# Patient Record
Sex: FEMALE | Race: WHITE | Employment: FULL TIME | URBAN - METROPOLITAN AREA
[De-identification: names, ages, dates, MRNs, and addresses within clinical notes are randomized per-mention and may not be internally consistent; named-entity substitution may affect disease eponyms.]

---

## 2019-03-22 ENCOUNTER — DOCUMENTATION ONLY (OUTPATIENT)
Dept: SURGERY | Age: 49
End: 2019-03-22

## 2019-03-22 NOTE — PROGRESS NOTES
Outside breast imaging CDs have been received from [facility] and will be placed in nurse station drawer at [facility]. Pt has appt with Dr. Rosetta Kinsey on 4/5/19. Tonia Ramesh, has scanned reports into CC.

## 2019-04-05 ENCOUNTER — DOCUMENTATION ONLY (OUTPATIENT)
Dept: SURGERY | Age: 49
End: 2019-04-05

## 2019-04-05 ENCOUNTER — OFFICE VISIT (OUTPATIENT)
Dept: SURGERY | Age: 49
End: 2019-04-05

## 2019-04-05 VITALS
HEART RATE: 103 BPM | SYSTOLIC BLOOD PRESSURE: 116 MMHG | DIASTOLIC BLOOD PRESSURE: 65 MMHG | WEIGHT: 117 LBS | BODY MASS INDEX: 18.8 KG/M2 | HEIGHT: 66 IN

## 2019-04-05 DIAGNOSIS — N64.89 RADIAL SCAR OF BREAST: Primary | ICD-10-CM

## 2019-04-05 DIAGNOSIS — D36.9 PAPILLOMA: ICD-10-CM

## 2019-04-05 NOTE — PATIENT INSTRUCTIONS
Learning About Breast Cancer Screening  What is breast cancer screening? Breast cancer occurs when cells that are not normal grow in one or both of your breasts. Screening tests can help find breast cancer early. Cancer is easier to treat when it's found early. Having concerns about breast cancer is common. That's why it's important to talk with your doctor about when to start and how often to get screened for breast cancer. How is breast cancer screening done? Several screening tests can be used to check for breast cancer. · Mammograms check for signs of cancer using X-rays. They can show tumors that are too small for you or your doctor to feel. During a mammogram, a machine squeezes your breasts to make them flatter and easier to X-ray. At least two pictures are taken of each breast. One is taken from the top and one from the side. · 3-D mammograms are also called digital breast tomosynthesis. Your breast is positioned on a flat plate. A top plate is pressed against your breast to keep it in position. The X-ray arm then moves in an arc above the breast and takes many pictures. A computer uses these X-rays to create a three-dimensional image. · Clinical breast exams are a doctor's exam. Your doctor carefully feels your breasts and under your arms to check for lumps or other changes. After the screening, your doctor will tell you the results. You will also be told if you need any follow-up tests. When should you get screened? Talk with your doctor about when you should start being tested for breast cancer. How often you get tested and the kind of tests you get will depend on your age and your risk. The guidelines that follow are for women who have an average risk for breast cancer. If you have a higher risk for breast cancer, such as having a family history of breast cancer in multiple relatives or at a young age, your doctor may recommend different screening for you.   · Ages 21 to 44: Some experts recommend that women have a clinical breast exam every 3 years, starting at age 21. Ask your doctor how often you should have this test. If you have a high risk for breast cancer, talk with your doctor about when to start yearly mammograms and other screening tests. · Ages 36 and older: Talk with your doctor about how often you should have mammograms and clinical breast exams. What is your risk for breast cancer? If you don't already know your risk of breast cancer, you can ask your doctor about it. You can also look it up at www.cancer.gov/bcrisktool/. If your doctor says that you have a high or very high risk, ask about ways to reduce your risk. These could include getting extra screening, taking medicine, or having surgery. If you have a strong family history of breast cancer, ask your doctor about genetic testing. What steps can you take to stay healthy? Some things that increase your risk of breast cancer, such as your age and being female, cannot be controlled. But you can do some things to stay as healthy as you can. · Learn what your breasts normally look and feel like. If you notice any changes, tell your doctor. · Drink alcohol wisely. Your risk goes up the more you drink. For the best health, women should have no more than 1 drink a day or 7 drinks a week. · If you smoke, quit. When you quit smoking, you lower your chances of getting many types of cancer. You can also do your best to eat well, be active, and stay at a healthy weight. Eating healthy foods and being active every day, as well as staying at a healthy weight, may help prevent cancer. Where can you learn more? Go to http://dave-gumaro.info/. Enter A026 in the search box to learn more about \"Learning About Breast Cancer Screening. \"  Current as of: March 27, 2018  Content Version: 11.9  © 8194-4899 [employer].  Care instructions adapted under license by [employer] (which disclaims liability or warranty for this information). If you have questions about a medical condition or this instruction, always ask your healthcare professional. [place] [address] any warranty or liability for your use of this information.

## 2019-04-05 NOTE — PROGRESS NOTES
HISTORY OF PRESENT ILLNESS  Sumit Silver is a 51 y.o. female. HPI NEW Patient presents for consultation at the request of Dr. Jihan Vinson for LEFT breast papilloma and radial scar. The patient had no abnormal breast symptoms to report. This was detected from imaging. Having some soreness and bruising to biopsy site. History LEFT breast papilloma in 2015. Dr. Justin Craig. 3/8/19 - LEFT breast biopsy pathology revealed papilloma, radial scar, fibrocystic changes. No family history of breast or ovarian cancer. Breast imaging-  Screening mammogram 12/2018 BI-RADS 0 architectural distortion left breast  LEFT breast diagnostic mammogram 2/22/19 BI-RADS 4C. All reports in scanned into chart. [address] Past Medical History:   Diagnosis Date    Environmental allergies     Fibromyalgia     Other ill-defined conditions(799.89)     mitral valve prolapse    Other ill-defined conditions(799.89)     fibromyalgia     Past Surgical History:   Procedure Laterality Date    HX BREAST BIOPSY Left     benign excisional biopsy    HX HEENT      eye surgery     Social History     Socioeconomic History    Marital status: UNKNOWN     Spouse name: Not on file    Number of children: Not on file    Years of education: Not on file    Highest education level: Not on file   Occupational History    Not on file   Social Needs    Financial resource strain: Not on file    Food insecurity:     Worry: Not on file     Inability: Not on file    Transportation needs:     Medical: Not on file     Non-medical: Not on file   Tobacco Use    Smoking status: Never Smoker    Smokeless tobacco: Never Used   Substance and Sexual Activity    Alcohol use:  Yes     Alcohol/week: 7.0 oz     Types: 14 Glasses of wine per week    Drug use: No    Sexual activity: Yes     Partners: Male   Lifestyle    Physical activity:     Days per week: Not on file     Minutes per session: Not on file    Stress: Not on file   Relationships    Social connections:     Talks on phone: Not on file     Gets together: Not on file     Attends Confucianism service: Not on file     Active member of club or organization: Not on file     Attends meetings of clubs or organizations: Not on file     Relationship status: Not on file    Intimate partner violence:     Fear of current or ex partner: Not on file     Emotionally abused: Not on file     Physically abused: Not on file     Forced sexual activity: Not on file   Other Topics Concern    Not on file   Social History Narrative    Not on file     OB History    None      Obstetric Comments   Menarche:  14. LMP: 3/19/19. # of Children:  3. Age at Delivery of First Child:  28.   Hysterectomy/oophorectomy:  NO/NO. Breast Bx:  Yes LEFT 2015. Hx of Breast Feeding:  yes. BCP:  yes. Hormone therapy:  no.                No current outpatient medications on file. Allergies   Allergen Reactions    Morphine Nausea and Vomiting    Percocet [Oxycodone-Acetaminophen] Nausea and Vomiting       Review of Systems   Constitutional: Negative. HENT: Negative. Eyes: Negative. Respiratory: Negative. Cardiovascular: Negative. Gastrointestinal: Positive for heartburn. Genitourinary: Positive for frequency and urgency. Musculoskeletal: Positive for myalgias. Skin: Negative. Neurological: Negative. Endo/Heme/Allergies: Negative. Psychiatric/Behavioral: Negative. All other systems reviewed and are negative. Physical Exam   Constitutional: She is oriented to person, place, and time. She appears well-developed and well-nourished. HENT:   Head: Normocephalic. Eyes: EOM are normal.   Neck: Neck supple. No thyromegaly present. Cardiovascular: Intact distal pulses. Pulmonary/Chest: Effort normal and breath sounds normal. Right breast exhibits no inverted nipple, no mass, no nipple discharge, no skin change and no tenderness.  Left breast exhibits no inverted nipple, no mass, no nipple discharge, no skin change and no tenderness. Breasts are symmetrical.   Abdominal: Soft. Musculoskeletal: Normal range of motion. Lymphadenopathy:     She has no cervical adenopathy. She has no axillary adenopathy. Neurological: She is alert and oriented to person, place, and time. Skin: Skin is warm and dry. Nursing note and vitals reviewed. BREAST ULTRASOUND, Preop planning  Indication:preop planning  left Breast 8:00 3 cfn   Technique: The area was scanned using a high-frequency linear-array near-field transducer  Findings: 8 mm irregular mass with dropout  Impression: Biopsy site visible with ultrasound  Disposition:  Will schedule us guided excisional biopsy    ASSESSMENT and PLAN    ICD-10-CM ICD-9-CM    1. Radial scar of breast N64.89 611.89    2. Papilloma D36.9 [id number]      - due to risk of upgrade for both lesions recommend left breast us guided excisional biopsy  The procedure and risks were discussed. Risks include bleeding, bruising, scar, infection, need for more surgery  She understood and wished to proceed.

## 2019-04-05 NOTE — LETTER
4/5/2019 10:57 AM 
 
Patient:  Shea Pisano YOB: 1970 Date of Visit: 4/5/2019 Dear Clement Evangelista MD 
[address] [place] [address] [address] [place] [id number] VIA Facsimile: [phone] 
 : Thank you for referring Ms. Markus Castillo to me for evaluation/treatment. Below are the relevant portions of my assessment and plan of care. HISTORY OF PRESENT ILLNESS Shea Pisano is a 51 y.o. female. HPI NEW Patient presents for consultation at the request of Dr. Aretha Tam for LEFT breast papilloma and radial scar. The patient had no abnormal breast symptoms to report. This was detected from imaging. Having some soreness and bruising to biopsy site. History LEFT breast papilloma in 2015. Dr. Manolo Poe. 3/8/19 - LEFT breast biopsy pathology revealed papilloma, radial scar, fibrocystic changes. No family history of breast or ovarian cancer. Breast imaging- 
Screening mammogram 12/2018 BI-RADS 0 architectural distortion left breast 
LEFT breast diagnostic mammogram 2/22/19 BI-RADS 4C. All reports in scanned into chart. [address] Past Medical History:  
Diagnosis Date  Environmental allergies  Fibromyalgia  Other ill-defined conditions(799.89)   
 mitral valve prolapse  Other ill-defined conditions(799.89)   
 fibromyalgia Past Surgical History:  
Procedure Laterality Date  HX BREAST BIOPSY Left   
 benign excisional biopsy  HX HEENT    
 eye surgery Social History Socioeconomic History  Marital status: UNKNOWN Spouse name: Not on file  Number of children: Not on file  Years of education: Not on file  Highest education level: Not on file Occupational History  Not on file Social Needs  Financial resource strain: Not on file  Food insecurity:  
  Worry: Not on file Inability: Not on file  Transportation needs:  
  Medical: Not on file Non-medical: Not on file Tobacco Use  Smoking status: Never Smoker  Smokeless tobacco: Never Used Substance and Sexual Activity  Alcohol use: Yes Alcohol/week: 7.0 oz Types: 14 Glasses of wine per week  Drug use: No  
 Sexual activity: Yes  
  Partners: Male Lifestyle  Physical activity:  
  Days per week: Not on file Minutes per session: Not on file  Stress: Not on file Relationships  Social connections:  
  Talks on phone: Not on file Gets together: Not on file Attends Quaker service: Not on file Active member of club or organization: Not on file Attends meetings of clubs or organizations: Not on file Relationship status: Not on file  Intimate partner violence:  
  Fear of current or ex partner: Not on file Emotionally abused: Not on file Physically abused: Not on file Forced sexual activity: Not on file Other Topics Concern  Not on file Social History Narrative  Not on file OB History None Obstetric Comments Menarche:  14. LMP: 3/19/19. # of Children:  3. Age at Delivery of First Child:  28.   Hysterectomy/oophorectomy:  NO/NO. Breast Bx:  Yes LEFT 2015. Hx of Breast Feeding:  yes. BCP:  yes. Hormone therapy:  no.  
 
  
  
  
 
No current outpatient medications on file. Allergies Allergen Reactions  Morphine Nausea and Vomiting  Percocet [Oxycodone-Acetaminophen] Nausea and Vomiting Review of Systems Constitutional: Negative. HENT: Negative. Eyes: Negative. Respiratory: Negative. Cardiovascular: Negative. Gastrointestinal: Positive for heartburn. Genitourinary: Positive for frequency and urgency. Musculoskeletal: Positive for myalgias. Skin: Negative. Neurological: Negative. Endo/Heme/Allergies: Negative. Psychiatric/Behavioral: Negative. All other systems reviewed and are negative. Physical Exam  
Constitutional: She is oriented to person, place, and time. She appears well-developed and well-nourished.   
HENT:  
 Head: Normocephalic. Eyes: EOM are normal.  
Neck: Neck supple. No thyromegaly present. Cardiovascular: Intact distal pulses. Pulmonary/Chest: Effort normal and breath sounds normal. Right breast exhibits no inverted nipple, no mass, no nipple discharge, no skin change and no tenderness. Left breast exhibits no inverted nipple, no mass, no nipple discharge, no skin change and no tenderness. Breasts are symmetrical.  
Abdominal: Soft. Musculoskeletal: Normal range of motion. Lymphadenopathy:  
  She has no cervical adenopathy. She has no axillary adenopathy. Neurological: She is alert and oriented to person, place, and time. Skin: Skin is warm and dry. Nursing note and vitals reviewed. BREAST ULTRASOUND, Preop planning Indication:preop planning  left Breast 8:00 3 cfn Technique: The area was scanned using a high-frequency linear-array near-field transducer Findings: 8 mm irregular mass with dropout Impression: Biopsy site visible with ultrasound Disposition:  Will schedule us guided excisional biopsy ASSESSMENT and PLAN 
  ICD-10-CM ICD-9-CM 1. Radial scar of breast N64.89 611.89   
2. Papilloma D36.9 [id number]   
 
- due to risk of upgrade for both lesions recommend left breast us guided excisional biopsy The procedure and risks were discussed. Risks include bleeding, bruising, scar, infection, need for more surgery She understood and wished to proceed. If you have questions, please do not hesitate to call me. I look forward to following Ms. Fontaine along with you. Sincerely, Yobani Beck MD

## 2019-04-05 NOTE — COMMUNICATION BODY
HISTORY OF PRESENT ILLNESS  Maritza Arevalo is a 51 y.o. female. HPI NEW Patient presents for consultation at the request of Dr. Jose A Winn for LEFT breast papilloma and radial scar. The patient had no abnormal breast symptoms to report. This was detected from imaging. Having some soreness and bruising to biopsy site. History LEFT breast papilloma in 2015. Dr. Azucena Jay. 3/8/19 - LEFT breast biopsy pathology revealed papilloma, radial scar, fibrocystic changes. No family history of breast or ovarian cancer. Breast imaging-  Screening mammogram 12/2018 BI-RADS 0 architectural distortion left breast  LEFT breast diagnostic mammogram 2/22/19 BI-RADS 4C. All reports in scanned into chart. [address] Past Medical History:   Diagnosis Date    Environmental allergies     Fibromyalgia     Other ill-defined conditions(799.89)     mitral valve prolapse    Other ill-defined conditions(799.89)     fibromyalgia     Past Surgical History:   Procedure Laterality Date    HX BREAST BIOPSY Left     benign excisional biopsy    HX HEENT      eye surgery     Social History     Socioeconomic History    Marital status: UNKNOWN     Spouse name: Not on file    Number of children: Not on file    Years of education: Not on file    Highest education level: Not on file   Occupational History    Not on file   Social Needs    Financial resource strain: Not on file    Food insecurity:     Worry: Not on file     Inability: Not on file    Transportation needs:     Medical: Not on file     Non-medical: Not on file   Tobacco Use    Smoking status: Never Smoker    Smokeless tobacco: Never Used   Substance and Sexual Activity    Alcohol use:  Yes     Alcohol/week: 7.0 oz     Types: 14 Glasses of wine per week    Drug use: No    Sexual activity: Yes     Partners: Male   Lifestyle    Physical activity:     Days per week: Not on file     Minutes per session: Not on file    Stress: Not on file   Relationships    Social connections:     Talks on phone: Not on file     Gets together: Not on file     Attends Sikhism service: Not on file     Active member of club or organization: Not on file     Attends meetings of clubs or organizations: Not on file     Relationship status: Not on file    Intimate partner violence:     Fear of current or ex partner: Not on file     Emotionally abused: Not on file     Physically abused: Not on file     Forced sexual activity: Not on file   Other Topics Concern    Not on file   Social History Narrative    Not on file     OB History    None      Obstetric Comments   Menarche:  14. LMP: 3/19/19. # of Children:  3. Age at Delivery of First Child:  28.   Hysterectomy/oophorectomy:  NO/NO. Breast Bx:  Yes LEFT 2015. Hx of Breast Feeding:  yes. BCP:  yes. Hormone therapy:  no.                No current outpatient medications on file. Allergies   Allergen Reactions    Morphine Nausea and Vomiting    Percocet [Oxycodone-Acetaminophen] Nausea and Vomiting       Review of Systems   Constitutional: Negative. HENT: Negative. Eyes: Negative. Respiratory: Negative. Cardiovascular: Negative. Gastrointestinal: Positive for heartburn. Genitourinary: Positive for frequency and urgency. Musculoskeletal: Positive for myalgias. Skin: Negative. Neurological: Negative. Endo/Heme/Allergies: Negative. Psychiatric/Behavioral: Negative. All other systems reviewed and are negative. Physical Exam   Constitutional: She is oriented to person, place, and time. She appears well-developed and well-nourished. HENT:   Head: Normocephalic. Eyes: EOM are normal.   Neck: Neck supple. No thyromegaly present. Cardiovascular: Intact distal pulses. Pulmonary/Chest: Effort normal and breath sounds normal. Right breast exhibits no inverted nipple, no mass, no nipple discharge, no skin change and no tenderness.  Left breast exhibits no inverted nipple, no mass, no nipple discharge, no skin change and no tenderness. Breasts are symmetrical.   Abdominal: Soft. Musculoskeletal: Normal range of motion. Lymphadenopathy:     She has no cervical adenopathy. She has no axillary adenopathy. Neurological: She is alert and oriented to person, place, and time. Skin: Skin is warm and dry. Nursing note and vitals reviewed. BREAST ULTRASOUND, Preop planning  Indication:preop planning  left Breast 8:00 3 cfn   Technique: The area was scanned using a high-frequency linear-array near-field transducer  Findings: 8 mm irregular mass with dropout  Impression: Biopsy site visible with ultrasound  Disposition:  Will schedule us guided excisional biopsy    ASSESSMENT and PLAN    ICD-10-CM ICD-9-CM    1. Radial scar of breast N64.89 611.89    2. Papilloma D36.9 [id number]      - due to risk of upgrade for both lesions recommend left breast us guided excisional biopsy  The procedure and risks were discussed. Risks include bleeding, bruising, scar, infection, need for more surgery  She understood and wished to proceed.

## 2019-05-29 NOTE — PERIOP NOTES
PAT call to patient. Reports that she has a hx of mitral valve prolapse, has seen cardiology in the past >10 years (unable to recall name of cardiologist). States that she needs to take abx prior to dental appointment. Call to pcp office  Notes requested. 1530 call to MACO Betancourt NP, reported last echo 10/2006 \"mild posterior mitral leaflet prolapse with mild/moderate regurg\". confirmed that ancef 2 gm IV on call to OR is appropriate antibiotic coverage for pt hx of MVP. Will notify Dr. Hazel Son office re abx.    1167 spoke with Meño Levy in Dr. Hazel Son office. She will notify Dr. Eduar Hernández re above.

## 2019-05-29 NOTE — PERIOP NOTES
[facility]  Ambulatory Surgery Unit  Pre-operative Instructions    Surgery/Procedure Date  6/6            Tentative Arrival Time 6:15am      1. On the day of your surgery/procedure, please report to the Ambulatory Surgery Unit Registration Desk and sign in at your designated time. The Ambulatory Surgery Unit is located in [facility] on the [facility] side of the [facility] across from the [address] [place] [address]. Please have all of your health insurance cards and a photo ID. 2. You must have someone with you to drive you home, as you should not drive a car for 24 hours following anesthesia. Please make arrangements for a responsible adult friend or family member to stay with you for at least the first 24 hours after your surgery. 3. Do not have anything to eat or drink (including water, gum, mints, coffee, juice) after 11:59 PM  6/5. This may not apply to medications prescribed by your physician. (Please note below the special instructions with medications to take the morning of surgery, if applicable.)    4. We recommend you do not drink any alcoholic beverages for 24 hours before and after your surgery. 5. Contact your surgeons office for instructions on the following medications: non-steroidal anti-inflammatory drugs (i.e. Advil, Aleve), vitamins, and supplements. (Some surgeons will want you to stop these medications prior to surgery and others may allow you to take them)   **If you are currently taking Plavix, Coumadin, Aspirin and/or other blood-thinning agents, contact your surgeon for instructions. ** Your surgeon will partner with the physician prescribing these medications to determine if it is safe to stop or if you need to continue taking. Please do not stop taking these medications without instructions from your surgeon.     6. In an effort to help prevent surgical site infection, we ask that you shower with an anti-bacterial soap (i.e. Dial/Safeguard, or the soap provided to you at your preadmission testing appointment) for 3 days prior to and on the morning of surgery, using a fresh towel after each shower. (Please begin this process with fresh bed linens.) Do not apply any lotions, powders, or deodorants after the shower on the day of your procedure. If applicable, please do not shave the operative site for 48 hours prior to surgery. 7. Wear comfortable clothes. Wear glasses instead of contacts. Do not bring any jewelry or money (other than copays or fees as instructed). Do not wear make-up, particularly mascara, the morning of your surgery. Do not wear nail polish, particularly if you are having foot /hand surgery. Wear your hair loose or down, no ponytails, buns, asmita pins or clips. All body piercings must be removed. 8. You should understand that if you do not follow these instructions your surgery may be cancelled. If your physical condition changes (i.e. fever, cold or flu) please contact your surgeon as soon as possible. 9. It is important that you be on time. If a situation occurs where you may be late, or if you have any questions or problems, please call [phone].    10. Your surgery time may be subject to change. You will receive a phone call the day prior to surgery to confirm your arrival time. Special Instructions: Take all medications and inhalers, as prescribed, on the morning of surgery with a sip of water EXCEPT: none    I understand a pre-operative phone call will be made to verify my surgery time. In the event that I am not available, I give permission for a message to be left on my answering service and/or with another person?       yes      Reviewed by phone with pt, verbalized understanding    ___________________      ___________________      ________________  (Signature of Patient)          (Witness)                   (Date and Time)

## 2019-05-30 ENCOUNTER — DOCUMENTATION ONLY (OUTPATIENT)
Dept: SURGERY | Age: 49
End: 2019-05-30

## 2019-05-30 DIAGNOSIS — D36.9 DUCTAL PAPILLOMA: Primary | ICD-10-CM

## 2019-05-30 DIAGNOSIS — N64.89 RADIAL SCAR OF BREAST: ICD-10-CM

## 2019-06-05 ENCOUNTER — ANESTHESIA EVENT (OUTPATIENT)
Dept: SURGERY | Age: 49
End: 2019-06-05
Payer: COMMERCIAL

## 2019-06-05 PROCEDURE — 77030038269 HC DRN EXT URIN PURWCK BARD -A

## 2019-06-06 ENCOUNTER — HOSPITAL ENCOUNTER (OUTPATIENT)
Age: 49
Setting detail: OUTPATIENT SURGERY
Discharge: HOME | End: 2019-06-06
Attending: SURGERY | Admitting: SURGERY
Payer: COMMERCIAL

## 2019-06-06 ENCOUNTER — ANESTHESIA (OUTPATIENT)
Dept: SURGERY | Age: 49
End: 2019-06-06
Payer: COMMERCIAL

## 2019-06-06 VITALS
HEIGHT: 66 IN | OXYGEN SATURATION: 98 % | TEMPERATURE: 98.2 F | SYSTOLIC BLOOD PRESSURE: 108 MMHG | HEART RATE: 66 BPM | RESPIRATION RATE: 14 BRPM | DIASTOLIC BLOOD PRESSURE: 62 MMHG | BODY MASS INDEX: 22.02 KG/M2 | WEIGHT: 137 LBS

## 2019-06-06 DIAGNOSIS — D36.9 DUCTAL PAPILLOMA: ICD-10-CM

## 2019-06-06 DIAGNOSIS — N64.89 RADIAL SCAR OF BREAST: ICD-10-CM

## 2019-06-06 LAB — HCG UR QL: NEGATIVE

## 2019-06-06 PROCEDURE — 74011250636 HC RX REV CODE- 250/636

## 2019-06-06 PROCEDURE — 77030021352 HC CBL LD SYS DISP COVD -B: Performed by: SURGERY

## 2019-06-06 PROCEDURE — 74011250636 HC RX REV CODE- 250/636: Performed by: ANESTHESIOLOGY

## 2019-06-06 PROCEDURE — 81025 URINE PREGNANCY TEST: CPT

## 2019-06-06 PROCEDURE — 76030000000 HC AMB SURG OR TIME 0.5 TO 1: Performed by: SURGERY

## 2019-06-06 PROCEDURE — 77030002996 HC SUT SLK J&J -A: Performed by: SURGERY

## 2019-06-06 PROCEDURE — 88305 TISSUE EXAM BY PATHOLOGIST: CPT

## 2019-06-06 PROCEDURE — 76210000040 HC AMBSU PH I REC FIRST 0.5 HR: Performed by: SURGERY

## 2019-06-06 PROCEDURE — 77030020143 HC AIRWY LARYN INTUB CGAS -A: Performed by: NURSE ANESTHETIST, CERTIFIED REGISTERED

## 2019-06-06 PROCEDURE — 74011000250 HC RX REV CODE- 250: Performed by: SURGERY

## 2019-06-06 PROCEDURE — 77030011640 HC PAD GRND REM COVD -A: Performed by: SURGERY

## 2019-06-06 PROCEDURE — 77030011267 HC ELECTRD BLD COVD -A: Performed by: SURGERY

## 2019-06-06 PROCEDURE — 76060000061 HC AMB SURG ANES 0.5 TO 1 HR: Performed by: SURGERY

## 2019-06-06 PROCEDURE — 74011250637 HC RX REV CODE- 250/637

## 2019-06-06 PROCEDURE — 77030002933 HC SUT MCRYL J&J -A: Performed by: SURGERY

## 2019-06-06 PROCEDURE — 77030018836 HC SOL IRR NACL ICUM -A: Performed by: SURGERY

## 2019-06-06 PROCEDURE — 77030031139 HC SUT VCRL2 J&J -A: Performed by: SURGERY

## 2019-06-06 PROCEDURE — 76210000046 HC AMBSU PH II REC FIRST 0.5 HR: Performed by: SURGERY

## 2019-06-06 PROCEDURE — 77030039266 HC ADH SKN EXOFIN S2SG -A: Performed by: SURGERY

## 2019-06-06 RX ORDER — LIDOCAINE HYDROCHLORIDE 10 MG/ML
0.1 INJECTION, SOLUTION EPIDURAL; INFILTRATION; INTRACAUDAL; PERINEURAL AS NEEDED
Status: DISCONTINUED | OUTPATIENT
Start: 2019-06-06 | End: 2019-06-06 | Stop reason: HOSPADM

## 2019-06-06 RX ORDER — LIDOCAINE HYDROCHLORIDE 20 MG/ML
INJECTION, SOLUTION EPIDURAL; INFILTRATION; INTRACAUDAL; PERINEURAL AS NEEDED
Status: DISCONTINUED | OUTPATIENT
Start: 2019-06-06 | End: 2019-06-06 | Stop reason: HOSPADM

## 2019-06-06 RX ORDER — SODIUM CHLORIDE 0.9 % (FLUSH) 0.9 %
5-40 SYRINGE (ML) INJECTION AS NEEDED
Status: DISCONTINUED | OUTPATIENT
Start: 2019-06-06 | End: 2019-06-06 | Stop reason: HOSPADM

## 2019-06-06 RX ORDER — PROPOFOL 10 MG/ML
INJECTION, EMULSION INTRAVENOUS AS NEEDED
Status: DISCONTINUED | OUTPATIENT
Start: 2019-06-06 | End: 2019-06-06 | Stop reason: HOSPADM

## 2019-06-06 RX ORDER — SCOLOPAMINE TRANSDERMAL SYSTEM 1 MG/1
1 PATCH, EXTENDED RELEASE TRANSDERMAL ONCE
Status: DISCONTINUED | OUTPATIENT
Start: 2019-06-06 | End: 2019-06-06 | Stop reason: HOSPADM

## 2019-06-06 RX ORDER — DEXAMETHASONE SODIUM PHOSPHATE 4 MG/ML
INJECTION, SOLUTION INTRA-ARTICULAR; INTRALESIONAL; INTRAMUSCULAR; INTRAVENOUS; SOFT TISSUE AS NEEDED
Status: DISCONTINUED | OUTPATIENT
Start: 2019-06-06 | End: 2019-06-06 | Stop reason: HOSPADM

## 2019-06-06 RX ORDER — LIDOCAINE HYDROCHLORIDE AND EPINEPHRINE 10; 10 MG/ML; UG/ML
1.5 INJECTION, SOLUTION INFILTRATION; PERINEURAL ONCE
Status: COMPLETED | OUTPATIENT
Start: 2019-06-06 | End: 2019-06-06

## 2019-06-06 RX ORDER — BUPIVACAINE HYDROCHLORIDE 2.5 MG/ML
20 INJECTION, SOLUTION EPIDURAL; INFILTRATION; INTRACAUDAL ONCE
Status: COMPLETED | OUTPATIENT
Start: 2019-06-06 | End: 2019-06-06

## 2019-06-06 RX ORDER — ONDANSETRON 4 MG/1
4 TABLET, ORALLY DISINTEGRATING ORAL
Qty: 5 TAB | Refills: 1 | Status: SHIPPED | OUTPATIENT
Start: 2019-06-06 | End: 2019-06-28 | Stop reason: ALTCHOICE

## 2019-06-06 RX ORDER — FENTANYL CITRATE 50 UG/ML
25 INJECTION, SOLUTION INTRAMUSCULAR; INTRAVENOUS
Status: DISCONTINUED | OUTPATIENT
Start: 2019-06-06 | End: 2019-06-06 | Stop reason: HOSPADM

## 2019-06-06 RX ORDER — SODIUM CHLORIDE, SODIUM LACTATE, POTASSIUM CHLORIDE, CALCIUM CHLORIDE 600; 310; 30; 20 MG/100ML; MG/100ML; MG/100ML; MG/100ML
25 INJECTION, SOLUTION INTRAVENOUS CONTINUOUS
Status: DISCONTINUED | OUTPATIENT
Start: 2019-06-06 | End: 2019-06-06 | Stop reason: HOSPADM

## 2019-06-06 RX ORDER — SODIUM CHLORIDE 0.9 % (FLUSH) 0.9 %
5-40 SYRINGE (ML) INJECTION EVERY 8 HOURS
Status: DISCONTINUED | OUTPATIENT
Start: 2019-06-06 | End: 2019-06-06 | Stop reason: HOSPADM

## 2019-06-06 RX ORDER — FENTANYL CITRATE 50 UG/ML
INJECTION, SOLUTION INTRAMUSCULAR; INTRAVENOUS AS NEEDED
Status: DISCONTINUED | OUTPATIENT
Start: 2019-06-06 | End: 2019-06-06 | Stop reason: HOSPADM

## 2019-06-06 RX ORDER — PROPOFOL 10 MG/ML
INJECTION, EMULSION INTRAVENOUS
Status: DISCONTINUED | OUTPATIENT
Start: 2019-06-06 | End: 2019-06-06 | Stop reason: HOSPADM

## 2019-06-06 RX ORDER — ONDANSETRON 2 MG/ML
INJECTION INTRAMUSCULAR; INTRAVENOUS AS NEEDED
Status: DISCONTINUED | OUTPATIENT
Start: 2019-06-06 | End: 2019-06-06 | Stop reason: HOSPADM

## 2019-06-06 RX ORDER — KETOROLAC TROMETHAMINE 30 MG/ML
INJECTION, SOLUTION INTRAMUSCULAR; INTRAVENOUS AS NEEDED
Status: DISCONTINUED | OUTPATIENT
Start: 2019-06-06 | End: 2019-06-06 | Stop reason: HOSPADM

## 2019-06-06 RX ORDER — DIPHENHYDRAMINE HYDROCHLORIDE 50 MG/ML
12.5 INJECTION, SOLUTION INTRAMUSCULAR; INTRAVENOUS AS NEEDED
Status: DISCONTINUED | OUTPATIENT
Start: 2019-06-06 | End: 2019-06-06 | Stop reason: HOSPADM

## 2019-06-06 RX ORDER — HYDROMORPHONE HYDROCHLORIDE 2 MG/1
2 TABLET ORAL
Qty: 30 TAB | Refills: 0 | Status: SHIPPED | OUTPATIENT
Start: 2019-06-06 | End: 2019-06-09

## 2019-06-06 RX ORDER — CEFAZOLIN SODIUM/WATER 2 G/20 ML
SYRINGE (ML) INTRAVENOUS
Status: DISCONTINUED
Start: 2019-06-06 | End: 2019-06-06 | Stop reason: HOSPADM

## 2019-06-06 RX ORDER — SCOLOPAMINE TRANSDERMAL SYSTEM 1 MG/1
PATCH, EXTENDED RELEASE TRANSDERMAL
Status: COMPLETED
Start: 2019-06-06 | End: 2019-06-06

## 2019-06-06 RX ORDER — HYDROMORPHONE HYDROCHLORIDE 1 MG/ML
.2-.5 INJECTION, SOLUTION INTRAMUSCULAR; INTRAVENOUS; SUBCUTANEOUS ONCE
Status: DISCONTINUED | OUTPATIENT
Start: 2019-06-06 | End: 2019-06-06 | Stop reason: HOSPADM

## 2019-06-06 RX ORDER — HYDROCODONE BITARTRATE AND ACETAMINOPHEN 5; 325 MG/1; MG/1
1 TABLET ORAL AS NEEDED
Status: DISCONTINUED | OUTPATIENT
Start: 2019-06-06 | End: 2019-06-06 | Stop reason: HOSPADM

## 2019-06-06 RX ORDER — CEFAZOLIN SODIUM 1 G/3ML
INJECTION, POWDER, FOR SOLUTION INTRAMUSCULAR; INTRAVENOUS AS NEEDED
Status: DISCONTINUED | OUTPATIENT
Start: 2019-06-06 | End: 2019-06-06 | Stop reason: HOSPADM

## 2019-06-06 RX ADMIN — ONDANSETRON 4 MG: 2 INJECTION INTRAMUSCULAR; INTRAVENOUS at 07:46

## 2019-06-06 RX ADMIN — KETOROLAC TROMETHAMINE 30 MG: 30 INJECTION, SOLUTION INTRAMUSCULAR; INTRAVENOUS at 08:09

## 2019-06-06 RX ADMIN — DEXAMETHASONE SODIUM PHOSPHATE 8 MG: 4 INJECTION, SOLUTION INTRA-ARTICULAR; INTRALESIONAL; INTRAMUSCULAR; INTRAVENOUS; SOFT TISSUE at 07:44

## 2019-06-06 RX ADMIN — PROPOFOL 125 MCG/KG/MIN: 10 INJECTION, EMULSION INTRAVENOUS at 07:36

## 2019-06-06 RX ADMIN — FENTANYL CITRATE 100 MCG: 50 INJECTION, SOLUTION INTRAMUSCULAR; INTRAVENOUS at 07:37

## 2019-06-06 RX ADMIN — LIDOCAINE HYDROCHLORIDE 80 MG: 20 INJECTION, SOLUTION EPIDURAL; INFILTRATION; INTRACAUDAL; PERINEURAL at 07:37

## 2019-06-06 RX ADMIN — PROPOFOL 200 MG: 10 INJECTION, EMULSION INTRAVENOUS at 07:37

## 2019-06-06 RX ADMIN — SODIUM CHLORIDE, SODIUM LACTATE, POTASSIUM CHLORIDE, AND CALCIUM CHLORIDE 25 ML/HR: 600; 310; 30; 20 INJECTION, SOLUTION INTRAVENOUS at 06:50

## 2019-06-06 RX ADMIN — CEFAZOLIN SODIUM 2 G: 1 INJECTION, POWDER, FOR SOLUTION INTRAMUSCULAR; INTRAVENOUS at 07:39

## 2019-06-06 NOTE — PERIOP NOTES
Permission received to review discharge instructions and discuss private health information with fiance  Patient states that son will be with them for at least 24 hours following today's procedure.    Bear Paw warmer attached to pt's gown; remote given to pt

## 2019-06-06 NOTE — ANESTHESIA POSTPROCEDURE EVALUATION
Procedure(s):  LEFT BREAST EXCISIONAL BIOPSY WITH ULTRASOUND.     general, total IV anesthesia    Anesthesia Post Evaluation      Multimodal analgesia: multimodal analgesia used between 6 hours prior to anesthesia start to PACU discharge  Patient location during evaluation: bedside  Patient participation: complete - patient participated  Level of consciousness: awake and alert  Pain score: 0  Airway patency: patent  Anesthetic complications: no  Cardiovascular status: acceptable  Respiratory status: acceptable  Hydration status: acceptable  Post anesthesia nausea and vomiting:  none      Vitals Value Taken Time   /64 6/6/2019  8:32 AM   Temp 36.7 °C (98.1 °F) 6/6/2019  8:23 AM   Pulse 78 6/6/2019  8:32 AM   Resp 14 6/6/2019  8:32 AM   SpO2 99 % 6/6/2019  8:32 AM

## 2019-06-06 NOTE — BRIEF OP NOTE
BRIEF OPERATIVE NOTE    Date of Procedure: 6/6/2019   Preoperative Diagnosis: LEFT BREAST PAPILLOMA AND RADIAL SCAR  Postoperative Diagnosis: LEFT BREAST PAPILLOMA AND RADIAL SCAR    Procedure(s):  LEFT BREAST EXCISIONAL BIOPSY WITH ULTRASOUND  Surgeon(s) and Role:     Chaya Venegas MD - Primary         Surgical Assistant: shu ritchie    Surgical Staff:  Circ-1: Antoinette Vargas RN  Scrub Tech-1: Bernard Small   Surg Asst-1: Alondra Donaldson  Event Time In Time Out   Incision Start 4467    Incision Close 0813      Anesthesia: General   Estimated Blood Loss: 10 ml   Specimens:   ID Type Source Tests Collected by Time Destination   1 : LEFT BREAST EXCISIONAL BIOPSY Preservative Breast  Sam Pantoja MD 6/6/2019 0800 Pathology      Findings: clip and lesion excised    Complications:  none  Implants: * No implants in log *    Dictated [id number]

## 2019-06-06 NOTE — PERIOP NOTES
Esme Adams  1970  [id number]    Situation:  Verbal report given from: CARIDAD Wolf CRNA, RN  Procedure: Procedure(s):  LEFT BREAST EXCISIONAL BIOPSY WITH ULTRASOUND    Background:    Preoperative diagnosis: LEFT BREAST PAPILLOMA AND RADIAL SCAR    Postoperative diagnosis: LEFT BREAST PAPILLOMA AND RADIAL SCAR    :  Dr. Stanford Zamora    Assistant(s): Circ-1: Ameya Quiroz RN  Scrub Tech-1: Varma Craft   Surg Asst-1: Neita Age    Specimens:   ID Type Source Tests Collected by Time Destination   1 : LEFT BREAST EXCISIONAL BIOPSY Preservative Breast  Jesse Willis MD 6/6/2019 0800 Pathology       Assessment:  Intra-procedure medications         Anesthesia gave intra-procedure sedation and medications, see anesthesia flow sheet     Intravenous fluids: LR@ KVO     Vital signs stable       Recommendation:    Permission to share finding with sung sapp : yes

## 2019-06-06 NOTE — OP NOTES
[address]  OPERATIVE REPORT    Name:  Mildred Nolasco  MR#:  [id number]  :  1970  ACCOUNT #:  [de-identified]  DATE OF SERVICE:  2019      PREOPERATIVE DIAGNOSIS:  Left breast papilloma, radial scar. POSTOPERATIVE DIAGNOSIS:  Left breast papilloma, radial scar. PROCEDURE PERFORMED:  Left breast ultrasound-guided excisional biopsy. SURGEON:  Marci Nguyen MD    ASSISTANT:  Brady Kolb. Poli Leyva MD    ANESTHESIA:  General.    COMPLICATIONS:  None. SPECIMENS REMOVED:  Left breast excisional biopsy. IMPLANTS:  No implants. ESTIMATED BLOOD LOSS:  10 mL. FINDINGS:  Clip and lesion excised. INDICATION FOR PROCEDURE:  A 71-year-old female with left breast radial scar and papilloma. Excision was recommended due to risk of upgrade. PROCEDURE IN DETAIL:  The patient was seen in the preop holding area where surgical site was marked by surgeon. Informed consent was obtained. Left breast was prepped and draped in the usual fashion. A time-out was performed. Attention was turned to 7 and 8 o'clock in the left breast where the lesion and clip were identified via ultrasound guidance. Ten mL of local anesthetic was injected into the skin. A periareolar incision was made with a #10 blade. Bovie cautery was used dissect down around the lesion all the way to chest wall. This was excised and marked short superior, long stitch lateral.  The cavity was irrigated. An additional 30 mL of local anesthetic was injected in the breast tissue. Deeper tissues were closed with interrupted 2-0 Vicryl and the skin with interrupted 3-0 Vicryl and 4-0 subcuticular Monocryl. All sponge, needle, and instrument counts were correct. Skin glue was placed on the skin. The patient went to Recovery in stable condition.       MD CELINE Ulloa/[email or web address]  D:  2019 8:22  T:  2019 9:56  JOB #:  [id number]

## 2019-06-06 NOTE — DISCHARGE INSTRUCTIONS
Discharge Instructions from Dr. Lui Castanon    · I will call you with the pathology results, typically within 1 week from today. · You may shower, but no hot tubs, swimming pools, or baths until your incision is healed. · No heavy lifting with the affected extremity (nothing greater than 5 pounds), and limit its use for the next 4-5 days. · You may use an ice pack for comfort for the next couple of days, but do not place ice directly on the skin. Rather, use a towel or clothing to serve as a barrier between skin and ice to prevent injury. · Follow medication instructions carefully. No aspirin, ibuprofen or aleve x 3 days postop. May take tylenol. · Wear surgical dressing for 24 hours, then remove. Wear supportive bra at all times. · You will have bruising and swelling  · Watch for signs of infection as listed below. · Redness  · Swelling  · Drainage from the incision or from your nipple that appears infected  · Fever over 101.5 degrees for consecutive readings, or over 99.5 if you are currently undergoing chemotherapy. · Call our office (number is below) for a follow-up appointment. · If you have any problems, our phone number is [phone]    TO PREVENT AN INFECTION      1. [address] YOUR HANDS     To prevent infection, good handwashing is the most important thing you or your caregiver can do.  Wash your hands with soap and water or use the hand  we gave you before you touch any wounds. 2. SHOWER     Use the antibacterial soap we gave you when you take a shower.  Shower with this soap until your wounds are healed.  To reach all areas of your body, you may need someone to help you.  Dont forget to clean your belly button with every shower. 3.  USE CLEAN SHEETS     Use freshly cleaned sheets on your bed after surgery.  To keep the surgery site clean, do not allow pets to sleep with you while your wound is still healing.      4. STOP SMOKING     Stop smoking, or at least cut back on smoking     Smoking slows your healing. 5.  CONTROL YOUR BLOOD SUGAR     High blood sugars slow wound healing.  If you are diabetic, control your blood sugar levels before and after your surgery. DO NOT TAKE TYLENOL/ACETAMINOPHEN WITH PERCOCET, [facility], [address] TAKE NARCOTIC PAIN MEDICATIONS WITH FOOD     Narcotics tend to be constipating, we suggest taking a stool softener such as Colace or Miralax (follow package instructions). DO NOT DRIVE WHILE TAKING NARCOTIC PAIN MEDICATIONS. DO NOT TAKE SLEEPING MEDICATIONS OR ANTIANXIETY MEDICATIONS WHILE TAKING NARCOTIC PAIN MEDICATIONS,  ESPECIALLY THE NIGHT OF ANESTHESIA! CPAP PATIENTS BE SURE TO WEAR MACHINE WHENEVER NAPPING OR SLEEPING! DISCHARGE SUMMARY from Nurse    The following personal items collected during your admission are returned to you:   Dental Appliance: Dental Appliances: None  Vision: Visual Aid: None  Hearing Aid:    Jewelry: Jewelry: None  Clothing: Clothing: With patient  Other Valuables: Other Valuables: None  Valuables sent to safe:        PATIENT INSTRUCTIONS:    After General Anesthesia or Intravenous Sedation, for 24 hours or while taking prescription Narcotics:        Someone should be with you for the next 24 hours. For your own safety, a responsible adult must drive you home. · Limit your activities  · Recommended activity: Rest today, up with assistance today. Do not climb stairs or shower unattended for the next 24 hours. · Please start with a soft bland diet and advance as tolerated (no nausea) to regular diet. · If you have a sore throat you should try the following: fluids, warm salt water gargles, or throat lozenges. If it does not improve after several days please follow up with your primary physician.   · Do not drive and operate hazardous machinery  · Do not make important personal or business decisions  · Do  not drink alcoholic beverages  · If you have not urinated within 8 hours after discharge, please contact your surgeon on call. Report the following to your surgeon:  · Excessive pain, swelling, redness or odor of or around the surgical area  · Temperature over 100.5  · Nausea and vomiting lasting longer than 4 hours or if unable to take medications  · Any signs of decreased circulation or nerve impairment to extremity: change in color, persistent  numbness, tingling, coldness or increase pain      · You will receive a Post Operative Call from one of the Recovery Room Nurses on the day after your surgery to check on you. It is very important for us to know how you are recovering after your surgery. If you have an issue or need to speak with someone, please call your surgeon, do not wait for the post operative call. · You may receive an e-mail or letter in the mail from [facility] regarding your experience with us in the Ambulatory Surgery Unit. Your feedback is valuable to us and we appreciate your participation in the survey. · If the above instructions are not adequate or you are having problems after your surgery, call the physician at their office number. · We wish you a speedy recovery ? What to do at Home:      *  Please give a list of your current medications to your Primary Care Provider. *  Please update this list whenever your medications are discontinued, doses are      changed, or new medications (including over-the-counter products) are added. *  Please carry medication information at all times in case of emergency situations. These are general instructions for a healthy lifestyle:    No smoking/ No tobacco products/ Avoid exposure to second hand smoke    Surgeon General's Warning:  Quitting smoking now greatly reduces serious risk to your health.     Obesity, smoking, and sedentary lifestyle greatly increases your risk for illness    A healthy diet, regular physical exercise & weight monitoring are important for maintaining a healthy lifestyle    You may be retaining fluid if you have a history of heart failure or if you experience any of the following symptoms:  Weight gain of 3 pounds or more overnight or 5 pounds in a week, increased swelling in our hands or feet or shortness of breath while lying flat in bed. Please call your doctor as soon as you notice any of these symptoms; do not wait until your next office visit. Recognize signs and symptoms of STROKE:    B - Balance  E - Eyes    F-  Face looks uneven  A-  Arms unable to move or move even  S-  Speech slurred or non-existent  T-  Time-call 911 as soon as signs and symptoms begin-DO NOT go       Back to bed or wait to see if you get better-TIME IS BRAIN. If you have not received your influenza and/or pneumococcal vaccine, please follow up with your primary care physician. The discharge information has been reviewed with the patient and caregiver. The patient and caregiver verbalized understanding.

## 2019-06-06 NOTE — ANESTHESIA PREPROCEDURE EVALUATION
Relevant Problems   No relevant active problems       Anesthetic History     PONV (motion sickness)          Review of Systems / Medical History  Patient summary reviewed, nursing notes reviewed and pertinent labs reviewed    Pulmonary                Comments: allergies   Neuro/Psych   Within defined limits           Cardiovascular      Valvular problems/murmurs (MVP; asymptomatic)            Exercise tolerance: >4 METS     GI/Hepatic/Renal  Within defined limits              Endo/Other  Within defined limits           Other Findings   Comments: Fibromyalgia         Physical Exam    Airway  Mallampati: I  TM Distance: > 6 cm  Neck ROM: normal range of motion   Mouth opening: Normal     Cardiovascular    Rhythm: regular  Rate: normal      Pertinent negatives: No murmur   Dental  No notable dental hx       Pulmonary  Breath sounds clear to auscultation               Abdominal  GI exam deferred       Other Findings            Anesthetic Plan    ASA: 2  Anesthesia type: general and total IV anesthesia          Induction: Intravenous  Anesthetic plan and risks discussed with: Patient      PONV prophylaxis/ scopolamine patch

## 2019-06-06 NOTE — H&P
HISTORY OF PRESENT ILLNESS  Ladi Perdue is a 51 y.o. female. HPI NEW Patient presents for consultation at the request of Dr. Josefina Sheriff for LEFT breast papilloma and radial scar. The patient had no abnormal breast symptoms to report. This was detected from imaging. Having some soreness and bruising to biopsy site. History LEFT breast papilloma in 2015. Dr. Haja Sandoval.     3/8/19 - LEFT breast biopsy pathology revealed papilloma, radial scar, fibrocystic changes.     No family history of breast or ovarian cancer.     Breast imaging-  Screening mammogram 12/2018 BI-RADS 0 architectural distortion left breast  LEFT breast diagnostic mammogram 2/22/19 BI-RADS 4C. All reports in scanned into chart. [address] Past Medical History:   Diagnosis Date    Environmental allergies      Fibromyalgia      Other ill-defined conditions(799.89)       mitral valve prolapse    Other ill-defined conditions(799.89)       fibromyalgia            Past Surgical History:   Procedure Laterality Date    HX BREAST BIOPSY Left       benign excisional biopsy    HX HEENT         eye surgery      Social History            Socioeconomic History    Marital status: UNKNOWN       Spouse name: Not on file    Number of children: Not on file    Years of education: Not on file    Highest education level: Not on file   Occupational History    Not on file   Social Needs    Financial resource strain: Not on file    Food insecurity:       Worry: Not on file       Inability: Not on file    Transportation needs:       Medical: Not on file       Non-medical: Not on file   Tobacco Use    Smoking status: Never Smoker    Smokeless tobacco: Never Used   Substance and Sexual Activity    Alcohol use:  Yes       Alcohol/week: 7.0 oz       Types: 14 Glasses of wine per week    Drug use: No    Sexual activity: Yes       Partners: Male   Lifestyle    Physical activity:       Days per week: Not on file       Minutes per session: Not on file    Stress: Not on file   Relationships    Social connections:       Talks on phone: Not on file       Gets together: Not on file       Attends Spiritism service: Not on file       Active member of club or organization: Not on file       Attends meetings of clubs or organizations: Not on file       Relationship status: Not on file    Intimate partner violence:       Fear of current or ex partner: Not on file       Emotionally abused: Not on file       Physically abused: Not on file       Forced sexual activity: Not on file   Other Topics Concern    Not on file   Social History Narrative    Not on file      OB History    None       Obstetric Comments   Menarche:  14. LMP: 3/19/19. # of Children:  3. Age at Delivery of First Child:  28.   Hysterectomy/oophorectomy:  NO/NO. Breast Bx:  Yes LEFT 2015. Hx of Breast Feeding:  yes. BCP:  yes. Hormone therapy:  no.                    No current outpatient medications on file. Allergies   Allergen Reactions    Morphine Nausea and Vomiting    Percocet [Oxycodone-Acetaminophen] Nausea and Vomiting         Review of Systems   Constitutional: Negative. HENT: Negative. Eyes: Negative. Respiratory: Negative. Cardiovascular: Negative. Gastrointestinal: Positive for heartburn. Genitourinary: Positive for frequency and urgency. Musculoskeletal: Positive for myalgias. Skin: Negative. Neurological: Negative. Endo/Heme/Allergies: Negative. Psychiatric/Behavioral: Negative. All other systems reviewed and are negative.        Physical Exam   Constitutional: She is oriented to person, place, and time. She appears well-developed and well-nourished. HENT:   Head: Normocephalic. Eyes: EOM are normal.   Neck: Neck supple. No thyromegaly present. Cardiovascular: Intact distal pulses.    Pulmonary/Chest: Effort normal and breath sounds normal. Right breast exhibits no inverted nipple, no mass, no nipple discharge, no skin change and no tenderness. Left breast exhibits no inverted nipple, no mass, no nipple discharge, no skin change and no tenderness. Breasts are symmetrical.   Abdominal: Soft. Musculoskeletal: Normal range of motion. Lymphadenopathy:     She has no cervical adenopathy. She has no axillary adenopathy. Neurological: She is alert and oriented to person, place, and time. Skin: Skin is warm and dry. Nursing note and vitals reviewed.     BREAST ULTRASOUND, Preop planning  Indication:preop planning  left Breast 8:00 3 cfn   Technique: The area was scanned using a high-frequency linear-array near-field transducer  Findings: 8 mm irregular mass with dropout  Impression: Biopsy site visible with ultrasound  Disposition:  Will schedule us guided excisional biopsy     ASSESSMENT and PLAN      ICD-10-CM ICD-9-CM     1. Radial scar of breast N64.89 611.89     2. Papilloma D36.9 [id number]        - due to risk of upgrade for both lesions recommend left breast us guided excisional biopsy  The procedure and risks were discussed. Risks include bleeding, bruising, scar, infection, need for more surgery  She understood and wished to proceed.

## 2019-06-06 NOTE — PERIOP NOTES
Patient: Marnie Ricci MRN: [id number]  SSN: [id number]   YOB: 1970  Age: 47 y.o. Sex: female     Patient is status post Procedure(s):  LEFT BREAST EXCISIONAL BIOPSY WITH ULTRASOUND. Surgeon(s) and Role:     Bob Rosas MD - Primary    Local/Dose/Irrigation:  SEE MAR                  Peripheral IV 06/06/19 Right Hand (Active)   Site Assessment Clean, dry, & intact 6/6/2019  6:52 AM   Phlebitis Assessment 0 6/6/2019  6:52 AM   Infiltration Assessment 0 6/6/2019  6:52 AM   Dressing Status New 6/6/2019  6:52 AM   Dressing Type Tape;Transparent 6/6/2019  6:52 AM   Hub Color/Line Status Pink; Infusing 6/6/2019  6:52 AM            Airway - Endotracheal Tube 06/06/19 Oral (Active)                   Dressing/Packing:  Wound Breast Left-Dressing Type: Topical skin adhesive/glue; Other (Comment)(TEGADERM & TELFA) (06/06/19 0700)

## 2019-06-07 ENCOUNTER — TELEPHONE (OUTPATIENT)
Dept: SURGERY | Age: 49
End: 2019-06-07

## 2019-06-07 NOTE — TELEPHONE ENCOUNTER
The patient was called POD #1 to check on her status. I left a message and instructed her to call me if she has any questions. I reminded her of her post op appointment.

## 2019-06-28 ENCOUNTER — OFFICE VISIT (OUTPATIENT)
Dept: SURGERY | Age: 49
End: 2019-06-28

## 2019-06-28 VITALS
DIASTOLIC BLOOD PRESSURE: 73 MMHG | SYSTOLIC BLOOD PRESSURE: 106 MMHG | HEIGHT: 66 IN | HEART RATE: 61 BPM | BODY MASS INDEX: 22.02 KG/M2 | WEIGHT: 137 LBS

## 2019-06-28 DIAGNOSIS — N64.89 RADIAL SCAR OF BREAST: Primary | ICD-10-CM

## 2019-06-28 NOTE — PROGRESS NOTES
HISTORY OF PRESENT ILLNESS  Lai Black is a 47 y.o. female. HPI ESTABLISHED patient here for post-op follow-up, here on POD #22 for LEFT breast excisional biopsy for papilloma and radial scar. She states she has healed well, has no problems other than occasional \"nerve type twinges\" in the breast.   History LEFT breast papilloma in 2015. Dr. Norma Mullins.       FINAL PATHOLOGIC DIAGNOSIS   Left breast, excisional biopsy:   Complex sclerosing lesion with usual ductal hyperplasia, papilloma, and adenosis. Negative for atypia or malignancy. Microcalcifications and biopsy site changes identified. No family history of breast or ovarian cancer.     Breast imaging-  Screening mammogram 12/2018 BI-RADS 0 architectural distortion left breast  LEFT breast diagnostic mammogram 2/22/19 BI-RADS 4C. Review of Systems   All other systems reviewed and are negative. Physical Exam   Pulmonary/Chest:       Left periareolar incision healing well  Glue removed   Nursing note and vitals reviewed.       ASSESSMENT and PLAN    ICD-10-CM ICD-9-CM    1. Radial scar of breast N64.89 611.89      Radial scar  F/u as needed

## 2019-06-28 NOTE — PATIENT INSTRUCTIONS

## (undated) DEVICE — 1010 S-DRAPE TOWEL DRAPE 10/BX: Brand: STERI-DRAPE™

## (undated) DEVICE — COVER LT HNDL PLAS RIG 1 PER PK

## (undated) DEVICE — REM POLYHESIVE ADULT PATIENT RETURN ELECTRODE: Brand: VALLEYLAB

## (undated) DEVICE — INSULATED BLADE ELECTRODE: Brand: EDGE

## (undated) DEVICE — GOWN,SIRUS,FABRNF,XL,20/CS: Brand: MEDLINE

## (undated) DEVICE — 3M™ TEGADERM™ TRANSPARENT FILM DRESSING FRAME STYLE, 1626W, 4 IN X 4-3/4 IN (10 CM X 12 CM), 50/CT 4CT/CASE: Brand: 3M™ TEGADERM™

## (undated) DEVICE — CHEST/BREAST-LF: Brand: MEDLINE INDUSTRIES, INC.

## (undated) DEVICE — SOL IRRIGATION INJ NACL 0.9% 500ML BTL

## (undated) DEVICE — KENDALL DL ECG CABLE AND LEAD WIRE SYSTEM, 3-LEAD, SINGLE PATIENT USE: Brand: KENDALL

## (undated) DEVICE — NEEDLE HYPO 25GA L1.5IN BVL ORIENTED ECLIPSE

## (undated) DEVICE — SMOKE EVACUATION PENCIL: Brand: VALLEYLAB

## (undated) DEVICE — DRAPE PRB US TRNSDCR 6X96IN --

## (undated) DEVICE — SUTURE VCRL SZ 2-0 L27IN ABSRB UD L26MM SH 1/2 CIR J417H

## (undated) DEVICE — 3M™ TEGADERM™ TRANSPARENT FILM DRESSING FRAME STYLE, 1624W, 2-3/8 IN X 2-3/4 IN (6 CM X 7 CM), 100/CT 4CT/CASE: Brand: 3M™ TEGADERM™

## (undated) DEVICE — NON-ADHERENT DRESSING: Brand: TELFA

## (undated) DEVICE — APPLICATOR BNDG 1MM ADH PREMIERPRO EXOFIN

## (undated) DEVICE — INFECTION CONTROL KIT SYS

## (undated) DEVICE — STERILE POLYISOPRENE POWDER-FREE SURGICAL GLOVES WITH EMOLLIENT COATING: Brand: PROTEXIS

## (undated) DEVICE — (D)PREP SKN CHLRAPRP APPL 26ML -- CONVERT TO ITEM 371833

## (undated) DEVICE — SYR 10ML LUER LOK 1/5ML GRAD --

## (undated) DEVICE — SUTURE MCRYL SZ 4-0 L27IN ABSRB UD L19MM PS-2 1/2 CIR PRIM Y426H

## (undated) DEVICE — INTENDED FOR TISSUE SEPARATION, AND OTHER PROCEDURES THAT REQUIRE A SHARP SURGICAL BLADE TO PUNCTURE OR CUT.: Brand: BARD-PARKER ® CARBON RIB-BACK BLADES

## (undated) DEVICE — CONTINU-FLO SOLUTION SET, 2 INJECTION SITES, MALE LUER LOCK ADAPTER WITH RETRACTABLE COLLAR, LARGE BORE STOPCOCK WITH ROTATING MALE LUER LOCK EXTENSION SET, 2 INJECTION SITES, MALE LUER LOCK ADAPTER WITH RETRACTABLE COLLAR: Brand: INTERLINK/CONTINU-FLO

## (undated) DEVICE — SUTURE VCRL SZ 3-0 L27IN ABSRB UD L26MM SH 1/2 CIR J416H

## (undated) DEVICE — SUT SLK 2-0SH 30IN BLK --

## (undated) DEVICE — TOWEL SURG W17XL27IN STD BLU COT NONFENESTRATED PREWASHED